# Patient Record
Sex: MALE | Race: WHITE
[De-identification: names, ages, dates, MRNs, and addresses within clinical notes are randomized per-mention and may not be internally consistent; named-entity substitution may affect disease eponyms.]

---

## 2018-12-12 ENCOUNTER — HOSPITAL ENCOUNTER (EMERGENCY)
Dept: HOSPITAL 33 - ED | Age: 53
Discharge: HOME | End: 2018-12-12
Payer: SELF-PAY

## 2018-12-12 VITALS — OXYGEN SATURATION: 99 % | HEART RATE: 68 BPM | SYSTOLIC BLOOD PRESSURE: 165 MMHG | DIASTOLIC BLOOD PRESSURE: 106 MMHG

## 2018-12-12 DIAGNOSIS — E78.00: ICD-10-CM

## 2018-12-12 DIAGNOSIS — Z79.899: ICD-10-CM

## 2018-12-12 DIAGNOSIS — M54.2: ICD-10-CM

## 2018-12-12 DIAGNOSIS — R51: Primary | ICD-10-CM

## 2018-12-12 DIAGNOSIS — M54.5: ICD-10-CM

## 2018-12-12 DIAGNOSIS — I10: ICD-10-CM
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
## 2018-12-12 PROCEDURE — 99285 EMERGENCY DEPT VISIT HI MDM: CPT

## 2018-12-12 PROCEDURE — 72125 CT NECK SPINE W/O DYE: CPT

## 2018-12-12 PROCEDURE — 72131 CT LUMBAR SPINE W/O DYE: CPT

## 2018-12-12 PROCEDURE — 73030 X-RAY EXAM OF SHOULDER: CPT

## 2018-12-12 PROCEDURE — 96372 THER/PROPH/DIAG INJ SC/IM: CPT

## 2018-12-12 PROCEDURE — 70450 CT HEAD/BRAIN W/O DYE: CPT
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
## 2020-08-16 ENCOUNTER — HOSPITAL ENCOUNTER (EMERGENCY)
Dept: HOSPITAL 33 - ED | Age: 55
Discharge: HOME | End: 2020-08-16
Payer: COMMERCIAL

## 2020-08-16 VITALS — SYSTOLIC BLOOD PRESSURE: 145 MMHG | OXYGEN SATURATION: 100 % | DIASTOLIC BLOOD PRESSURE: 98 MMHG

## 2020-08-16 VITALS — HEART RATE: 76 BPM

## 2020-08-16 DIAGNOSIS — R05: Primary | ICD-10-CM

## 2020-08-16 DIAGNOSIS — J02.8: ICD-10-CM

## 2020-08-16 DIAGNOSIS — U07.1: ICD-10-CM

## 2020-08-16 LAB
ALBUMIN SERPL-MCNC: 4.3 G/DL (ref 3.5–5)
ALP SERPL-CCNC: 98 U/L (ref 38–126)
ALT SERPL-CCNC: 18 U/L (ref 0–50)
ANION GAP SERPL CALC-SCNC: 10 MEQ/L (ref 5–15)
AST SERPL QL: 22 U/L (ref 17–59)
BASOPHILS # BLD AUTO: 0.02 10*3/UL (ref 0–0.4)
BASOPHILS NFR BLD AUTO: 0.3 % (ref 0–0.4)
BILIRUB BLD-MCNC: 0.7 MG/DL (ref 0.2–1.3)
BUN SERPL-MCNC: 18 MG/DL (ref 9–20)
CALCIUM SPEC-MCNC: 9.6 MG/DL (ref 8.4–10.2)
CHLORIDE SERPL-SCNC: 104 MMOL/L (ref 98–107)
CO2 SERPL-SCNC: 29 MMOL/L (ref 22–30)
CREAT SERPL-MCNC: 1.01 MG/DL (ref 0.66–1.25)
EOSINOPHIL # BLD AUTO: 0.61 10*3/UL (ref 0–0.5)
GLUCOSE SERPL-MCNC: 105 MG/DL (ref 74–106)
HCT VFR BLD AUTO: 41.8 % (ref 42–50)
HGB BLD-MCNC: 14.4 GM/DL (ref 12.5–18)
LYMPHOCYTES # SPEC AUTO: 1.61 10*3/UL (ref 1–4.6)
MCH RBC QN AUTO: 31.2 PG (ref 26–32)
MCHC RBC AUTO-ENTMCNC: 34.4 G/DL (ref 32–36)
MONOCYTES # BLD AUTO: 0.61 10*3/UL (ref 0–1.3)
PLATELET # BLD AUTO: 176 K/MM3 (ref 150–450)
POTASSIUM SERPLBLD-SCNC: 4.9 MMOL/L (ref 3.5–5.1)
PROT SERPL-MCNC: 7.7 G/DL (ref 6.3–8.2)
RBC # BLD AUTO: 4.62 M/MM3 (ref 4.1–5.6)
SODIUM SERPL-SCNC: 138 MMOL/L (ref 137–145)
WBC # BLD AUTO: 7.5 K/MM3 (ref 4–10.5)

## 2020-08-16 PROCEDURE — 71045 X-RAY EXAM CHEST 1 VIEW: CPT

## 2020-08-16 PROCEDURE — 99284 EMERGENCY DEPT VISIT MOD MDM: CPT

## 2020-08-16 PROCEDURE — 80053 COMPREHEN METABOLIC PANEL: CPT

## 2020-08-16 PROCEDURE — 36415 COLL VENOUS BLD VENIPUNCTURE: CPT

## 2020-08-16 PROCEDURE — 85025 COMPLETE CBC W/AUTO DIFF WBC: CPT

## 2020-08-16 PROCEDURE — U0003 INFECTIOUS AGENT DETECTION BY NUCLEIC ACID (DNA OR RNA); SEVERE ACUTE RESPIRATORY SYNDROME CORONAVIRUS 2 (SARS-COV-2) (CORONAVIRUS DISEASE [COVID-19]), AMPLIFIED PROBE TECHNIQUE, MAKING USE OF HIGH THROUGHPUT TECHNOLOGIES AS DESCRIBED BY CMS-2020-01-R: HCPCS

## 2020-08-16 NOTE — XRAY
Indication: Cough and sore throat.



Comparison: October 21, 2019.



Portable chest again demonstrates normal heart and lungs with incidental

calcified granulomas.  Bony thorax intact again with mild degenerative

changes.  No new/acute findings.
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
## 2023-04-05 ENCOUNTER — HOSPITAL ENCOUNTER (EMERGENCY)
Dept: HOSPITAL 33 - ED | Age: 58
Discharge: HOME | End: 2023-04-05
Payer: COMMERCIAL

## 2023-04-05 VITALS — HEART RATE: 65 BPM | SYSTOLIC BLOOD PRESSURE: 158 MMHG | OXYGEN SATURATION: 98 % | DIASTOLIC BLOOD PRESSURE: 100 MMHG

## 2023-04-05 DIAGNOSIS — S50.11XA: ICD-10-CM

## 2023-04-05 DIAGNOSIS — Z28.310: ICD-10-CM

## 2023-04-05 DIAGNOSIS — E78.5: ICD-10-CM

## 2023-04-05 DIAGNOSIS — Z79.899: ICD-10-CM
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
## 2023-04-05 DIAGNOSIS — K44.9: ICD-10-CM

## 2023-04-05 DIAGNOSIS — S50.12XA: Primary | ICD-10-CM

## 2023-04-05 DIAGNOSIS — R07.81: ICD-10-CM

## 2023-04-05 DIAGNOSIS — J92.9: ICD-10-CM

## 2023-04-05 DIAGNOSIS — D71: ICD-10-CM

## 2023-04-05 DIAGNOSIS — K76.89: ICD-10-CM

## 2023-04-05 DIAGNOSIS — I10: ICD-10-CM

## 2023-04-05 PROCEDURE — 99283 EMERGENCY DEPT VISIT LOW MDM: CPT

## 2023-04-05 PROCEDURE — 73090 X-RAY EXAM OF FOREARM: CPT

## 2023-04-05 PROCEDURE — 71250 CT THORAX DX C-: CPT

## 2023-04-05 NOTE — ERPHSYRPT
- History of Present Illness


Time Seen by Provider: 04/05/23 18:47


Source: patient


Exam Limitations: no limitations


Patient Subjective Stated Complaint: PT states "I slipped on the steps and slid 

down 3 stairs.  My back on the right side hurts and my right forearm."


Triage Nursing Assessment: Pt presented alert and oriented X 3, skin pwd. Pt 

ambulates with an upright steady gait, able to speak in clear full sentences. Pt

has bruising, swelling, and abraison noted to right forerm, abrasion noted to 

back of right shoulder down to lower ribs on right side, tenderness noted.


Physician History: 


Patient is a 58-year-old male presents to our ED for evaluation status post slip

and fall.  Patient was descending 3 metal steps.  Patient slipped and fell onto 

his back.  Patient complains of pain at both forearms and right ribs.  Injury 

occurred prior to arrival.  No neck pain.  Cervical spine cleared clinically.  

No BHT or LOC.  Fall was mechanical.  Fall was not associated with any neuro 

cardiovascular symptomology.  No associated chest pain or shortness of breath.  

No nausea vomiting or diaphoresis.  No numbness tingling or weakness.  Patient's

pain is mild to moderate in intensity.  Patient declined pain medication.  

Patient's significant other at bedside.  They voiced no other complaints or 

concerns at this time.











Portions of this note were created with voice recognition technology.  There may

be grammatical, spelling, punctuation or sound alike errors





Timing/Duration: today


Severity: moderate


Modifying Factors: Improves With: movement


Associated Symptoms: denies symptoms


Allergies/Adverse Reactions: 








No Known Drug Allergies Allergy (Verified 08/16/20 09:31)


   





Home Medications: 








Amlodipine Besylate 5 mg*** [Norvasc 5 mg***] 1 tab PO DAILY 12/12/18 [History]


Atorvastatin Calcium 40 mg PO DAILY 12/12/18 [History]


Triamterene/Hydrochlorothiazid [Triamterene-Hctz 37.5-25 mg Tb] 1 tab PO DAILY 

12/12/18 [History]





Hx Tetanus, Diphtheria Vaccination/Date Given: No


Hx Influenza Vaccination/Date Given: No


Hx Pneumococcal Vaccination/Date Given: No


Immunizations Up to Date: No





Travel Risk





- International Travel


Have you traveled outside of the country in past 3 weeks: No





- Coronavirus Screening


Are you exhibiting any of the following symptoms?: No


Close contact with a COVID-19 positive Pt in past 14-21 Days: No





- Vaccine Status


Have you recieved a Covid-19 vaccination: No





- Review of Systems


Constitutional: No Symptoms, No Fever, No Chills


Eyes: No Symptoms


Ears, Nose, & Throat: No Symptoms


Respiratory: No Symptoms, No Cough, No Dyspnea


Cardiac: No Symptoms, No Chest Pain, No Edema, No Syncope


Abdominal/Gastrointestinal: No Symptoms, No Abdominal Pain, No Nausea, No 

Vomiting, No Diarrhea


Genitourinary Symptoms: No Symptoms, No Dysuria


Musculoskeletal: No Symptoms, No Back Pain, No Neck Pain


Skin: No Symptoms, No Rash


Neurological: No Symptoms, No Dizziness, No Focal Weakness, No Sensory Changes


Psychological: No Symptoms


Endocrine: No Symptoms


Hematologic/Lymphatic: No Symptoms


Immunological/Allergic: No Symptoms


All Other Systems: Reviewed and Negative





- Past Medical History


Pertinent Past Medical History: Yes


Neurological History: No Pertinent History


ENT History: No Pertinent History


Cardiac History: No Pertinent History, High Cholesterol, Hypertension


Respiratory History: No Pertinent History


Endocrine Medical History: No Pertinent History


Musculoskeletal History: No Pertinent History


GI Medical History: Hernia


 History: No Pertinent History


Psycho-Social History: No Pertinent History


Male Reproductive Disorders: No Pertinent History


Other Medical History: Fx to 3rd and 4th lumbar





- Past Surgical History


Past Surgical History: Yes


Neuro Surgical History: No Pertinent History


Cardiac: No Pertinent History


Respiratory: No Pertinent History


Gastrointestinal: No Pertinent History


Genitourinary: No Pertinent History


Musculoskeletal: Orthopedic Surgery


Male Surgical History: No Pertinent History


Other Surgical History: left elbow, joy knee surgery





- Social History


Smoking Status: Never smoker


Exposure to second hand smoke: No


Drug Use: none


Patient Lives Alone: No





- Nursing Vital Signs


Nursing Vital Signs: 


                               Initial Vital Signs











Temperature  98.6 F   04/05/23 17:28


 


Pulse Rate  66   04/05/23 17:28


 


Respiratory Rate  20   04/05/23 17:28


 


Blood Pressure  172/104   04/05/23 17:28


 


O2 Sat by Pulse Oximetry  99   04/05/23 17:28








                                   Pain Scale











Pain Intensity                 4

















- Physical Exam


General Appearance: no apparent distress, alert


Eye Exam: PERRL/EOMI, eyes nml inspection


Ears, Nose, Throat Exam: normal ENT inspection, TMs normal, pharynx normal, 

moist mucous membranes


Neck Exam: normal inspection, non-tender, supple, full range of motion


Respiratory Exam: normal breath sounds, lungs clear, No respiratory distress


Cardiovascular Exam: regular rate/rhythm, normal heart sounds, normal peripheral

 pulses


Gastrointestinal/Abdomen Exam: soft, normal bowel sounds, No tenderness, No mass


Back Exam: normal inspection, normal range of motion, No CVA tenderness, No 

vertebral tenderness


Extremity Exam: normal inspection, normal range of motion, pelvis stable


Neurologic Exam: alert, oriented x 3, cooperative, normal mood/affect, nml 

cerebellar function, nml station & gait, sensation nml, No motor deficits


Skin Exam: normal color, warm, dry, No rash


Lymphatic Exam: No adenopathy


**SpO2 Interpretation**: normal


SpO2: 99


O2 Delivery: Room Air





- Course


Nursing assessment & vital signs reviewed: Yes





- Radiology Exams


  ** Forearm


X-ray Interpretation: Interpreted by me (No fracture dislocations.)





  ** Other


X-ray Interpretation: Interpreted by me (Left forearm, no fracture dislocations.

  Soft tissue swelling)





- CT Exams


  ** Chest


CT Interpretation: Tele-radiologist Report (No comps.  Minimal right base 

calcified pleural thickening.  5 mm hepatic cyst and old granulomatous disease 

otherwise normal chest.  Also small hiatal hernia)


Ordered Tests: 


                               Active Orders 24 hr











 Category Date Time Status


 


 CHEST WITHOUT CONTRAST [CT] Stat Exams  04/05/23 17:49 Taken


 


 FOREARM Stat Exams  04/05/23 17:50 Taken


 


 FOREARM Stat Exams  04/05/23 17:50 Taken














- Progress


Progress: improved


Progress Note: 


4-year-old male with mechanical fall.  Physical exam revealed soft tissue 

swelling bilateral forearms.  X-rays of both forearms negative for fracture.  No

 dislocations.  CT chest negative for rib fracture.  Patient declined pain 

medication.  Patient reassessed.  Vital stable.  Will discharge home.  Patient 

agrees to follow-up with his primary care doctor within 48 hours for 

reevaluation.











Portions of this note were created with voice recognition technology.  There may

 be grammatical, spelling, punctuation or sound alike errors











Complexity of problem addressed is low.  Acute uncomplicated.  No critical care 

time.








Patient received 2 x-rays and a CT scan of chest.  Patient served as independent

 historian.  Patient's significant other at bedside.  Patient's significant 

other contributed to HPI.  Complexity of data reviewed and analyzed is moderate.

  X-rays independently reviewed by Dr. Sotomayor.











Risk of complication and or risk morbidity/mortality of patient management is 

minimal.  Patient declined pain medication.  Patient will be discharged home.





Plan of care discussed with patient.  Plan of care established via shared 

decision making model.  Vital stable.  No social determinants of health complica

ting patient's follow-up.





Patient/significant other voiced no other complaints or concerns at this time.





Portions of this note were created with voice recognition technology.  There may

 be grammatical, spelling, punctuation or sound alike errors











04/05/23 19:26





Counseled pt/family regarding: diagnosis, need for follow-up, rad results





- Departure


Departure Disposition: Home


Clinical Impression: 


 Fall, Forearm contusion, Right lung base calcified pleural plaqui, 5 mm hepatic

 cyst, Old granulomatous disease, Small hiatal hernia





Condition: Stable


Critical Care Time: No


Referrals: 


DOCTOR,NO FAMILY [Primary Care Provider] - Follow up/PCP as directed


MICHELET CARRIZALES MD [ACTIVE STAFF] - Follow up/PCP as directed


Additional Instructions: 


Discharge/Care Plan





CHARLES BLUE was seen on 04/05/23 in the Emergency Room. The patient was 

counseled regarding Diagnosis,Lab results, Imaging studies, need for follow up 

and when to return to the Emergency Room.





Prescriptions given:





Discharge Note





I have spoken with the patient and/or caregivers. I have explained the patient's

condition, diagnosis and treatment plan based on the information available to me

at this time. I have answered the patient's and/or caregiver's questions and 

addressed any concerns. The patient and/or caregivers have as good understanding

of the patient's diagnosis, condition and treatment plan as can be expected at 

this point. The vital signs have been stable. The patient's condition is stable 

and appropriate for discharge from the emergency department.





The patient will pursue further outpatient evaluation with the primary care 

physician or other designated or consulting physician as outlined in the 

discharge instructions. The patient and/or caregivers are agreeable to this plan

of care and follow-up instructions have been explained in detail. The patient 

and/or caregivers have received these instruction. The patient/and or caregivers

are aware that any significant change in condition or worsening of symptoms 

should prompt an immediate return to this or the closest emergency department or

call 911.

## 2023-04-06 NOTE — XRAY
Indication: Soft tissue swelling following fall.



Comparison: None



2 view left forearm demonstrates mild distal lateral soft tissue swelling.  No

other bony, articular, or soft tissue abnormalities.

## 2023-04-06 NOTE — XRAY
Indication: Soft tissue swelling following fall.



Comparison: None



2 view right forearm demonstrates mild proximal anterior medial soft tissue

swelling.  No other bony, articular, or soft tissue abnormalities.

## 2023-04-06 NOTE — XRAY
Indication: Right rib pain following fall.



Multiple contiguous axial images obtained through the chest without contrast.



Comparison: None



Lungs demonstrates minimal bilateral dependent atelectasis, very minimal right

posterior gutter calcified pleural plaquing and left lower lobe bullae.  No

suspicious pulmonary mass/nodule, infiltrate, effusion, or pneumothorax.

Heart not enlarged.  Aorta is normal in course and caliber.  Small mediastinal

and left hilar calcified nodes.  No pathologic mediastinal lymphadenopathy.

Small hiatal hernia.



Bony thorax intact with mild degenerative changes throughout the spine.



Limited upper abdomen demonstrates 5 mm hepatic cyst and previous

cholecystectomy.



Impression:

1.  Chronic findings including left lower lobe bullae, right base calcified

pleural plaquing, hiatal hernia, degenerative spondylosis, hepatic cyst, and

old granulomatous disease.

2.  Remaining CT chest without contrast exam is negative.

## 2025-03-25 ENCOUNTER — HOSPITAL ENCOUNTER (EMERGENCY)
Dept: HOSPITAL 33 - ED | Age: 60
Discharge: HOME | End: 2025-03-25
Payer: COMMERCIAL

## 2025-03-25 VITALS — RESPIRATION RATE: 16 BRPM | SYSTOLIC BLOOD PRESSURE: 146 MMHG | DIASTOLIC BLOOD PRESSURE: 92 MMHG

## 2025-03-25 VITALS — TEMPERATURE: 98.9 F

## 2025-03-25 VITALS — HEART RATE: 79 BPM | OXYGEN SATURATION: 99 %

## 2025-03-25 DIAGNOSIS — N43.3: Primary | ICD-10-CM

## 2025-03-25 DIAGNOSIS — R10.32: ICD-10-CM

## 2025-03-25 DIAGNOSIS — Z79.899: ICD-10-CM

## 2025-03-25 DIAGNOSIS — N50.812: ICD-10-CM

## 2025-03-25 DIAGNOSIS — R31.9: ICD-10-CM

## 2025-03-25 DIAGNOSIS — Z79.891: ICD-10-CM

## 2025-03-25 DIAGNOSIS — E78.5: ICD-10-CM

## 2025-03-25 DIAGNOSIS — I10: ICD-10-CM

## 2025-03-25 DIAGNOSIS — K57.92: ICD-10-CM

## 2025-03-25 LAB
ALBUMIN SERPL-MCNC: 4.7 G/DL (ref 3.5–5)
ALP SERPL-CCNC: 81 U/L (ref 38–126)
ALT SERPL-CCNC: 22 U/L (ref 0–50)
ANION GAP SERPL CALC-SCNC: 16.1 MEQ/L (ref 5–15)
AST SERPL QL: 31 U/L (ref 17–59)
BASOPHILS # BLD AUTO: 0.03 X10^3/UL (ref 0.01–0.08)
BASOPHILS NFR BLD AUTO: 0.3 % (ref 0.2–1.2)
BILIRUB BLD-MCNC: 1.2 MG/DL (ref 0.2–1.3)
BUN SERPL-MCNC: 19 MG/DL (ref 9–20)
CALCIUM SPEC-MCNC: 9.4 MG/DL (ref 8.4–10.2)
CHLORIDE SERPL-SCNC: 103 MMOL/L (ref 98–107)
CO2 SERPL-SCNC: 24 MMOL/L (ref 22–30)
CREAT SERPL-MCNC: 0.92 MG/DL (ref 0.66–1.25)
EOSINOPHIL # BLD AUTO: 0.1 X10^3/UL (ref 0.04–0.54)
GFR SERPLBLD BASED ON 1.73 SQ M-ARVRAT: 94.1 ML/MIN
GLUCOSE SERPL-MCNC: 91 MG/DL (ref 74–106)
HCT VFR BLD AUTO: 40.3 % (ref 40.1–51)
HGB BLD-MCNC: 14.4 G/DL (ref 13.7–17.5)
IMM GRANULOCYTES # BLD: 0.05 X10^3U/L (ref 0–0.03)
IMM GRANULOCYTES NFR BLD: 0.4 % (ref 0–0.43)
LYMPHOCYTES # SPEC AUTO: 1.6 X10^3/UL (ref 1.32–3.57)
MCH RBC QN AUTO: 30.9 PG (ref 25.7–32.2)
MCHC RBC AUTO-ENTMCNC: 35.7 G/DL (ref 32.3–36.5)
MONOCYTES # BLD AUTO: 0.76 X10^3/UL (ref 0.3–0.82)
NRBC # BLD AUTO: 0 X10^3U/L (ref 0–0.01)
NRBC BLD AUTO-RTO: 0 % (ref 0–0.2)
PLATELET # BLD AUTO: 209 X10^3/UL (ref 163–337)
POTASSIUM SERPLBLD-SCNC: 4.6 MMOL/L (ref 3.5–5.1)
PROT SERPL-MCNC: 7.7 G/DL (ref 6.3–8.2)
RBC # BLD AUTO: 4.66 X10^6/UL (ref 4.63–6.08)
RBC # URNS HPF: (no result) /HPF (ref 0–5)
SODIUM SERPL-SCNC: 138 MMOL/L (ref 135–145)
WBC # BLD AUTO: 11.5 X10^3/UL (ref 4.23–9.07)
WBC URNS QL MICRO: (no result) /HPF (ref 0–5)

## 2025-03-25 PROCEDURE — 96374 THER/PROPH/DIAG INJ IV PUSH: CPT

## 2025-03-25 PROCEDURE — 74176 CT ABD & PELVIS W/O CONTRAST: CPT

## 2025-03-25 PROCEDURE — 80053 COMPREHEN METABOLIC PANEL: CPT

## 2025-03-25 PROCEDURE — 85025 COMPLETE CBC W/AUTO DIFF WBC: CPT

## 2025-03-25 PROCEDURE — 96361 HYDRATE IV INFUSION ADD-ON: CPT

## 2025-03-25 PROCEDURE — 99285 EMERGENCY DEPT VISIT HI MDM: CPT

## 2025-03-25 PROCEDURE — 96375 TX/PRO/DX INJ NEW DRUG ADDON: CPT

## 2025-03-25 PROCEDURE — 96368 THER/DIAG CONCURRENT INF: CPT

## 2025-03-25 PROCEDURE — 84484 ASSAY OF TROPONIN QUANT: CPT

## 2025-03-25 PROCEDURE — 36415 COLL VENOUS BLD VENIPUNCTURE: CPT

## 2025-03-25 PROCEDURE — 76870 US EXAM SCROTUM: CPT

## 2025-03-25 PROCEDURE — 96365 THER/PROPH/DIAG IV INF INIT: CPT

## 2025-03-25 PROCEDURE — 81001 URINALYSIS AUTO W/SCOPE: CPT

## 2025-03-25 RX ADMIN — LEVOFLOXACIN STA MLS/HR: 5 INJECTION, SOLUTION INTRAVENOUS at 19:56

## 2025-03-25 RX ADMIN — KETOROLAC TROMETHAMINE ONE MG: 30 INJECTION, SOLUTION INTRAMUSCULAR; INTRAVENOUS at 17:38

## 2025-03-25 RX ADMIN — METRONIDAZOLE STA MLS/HR: 500 INJECTION, SOLUTION INTRAVENOUS at 19:12

## 2025-03-25 NOTE — ERPHSYRPT
- History of Present Illness


Time Seen by Provider: 03/25/25 17:04


Source: patient


Exam Limitations: no limitations


Patient Subjective Stated Complaint: pt states that he began to have pain to his

left groin pain. pt states that the pain is where he had a hernia repair


Triage Nursing Assessment: pt ambulated into the er; pt is axo x4; c/o left gr

oin pain; pt states 8/10 pain to left groin that radiates to left testicle; no 

swelling present to left testicle; skin PDW; no respiratory distress present; 

hypertensive


Physician History: 


60-year-old male presents to our ED for evaluation of acute onset left lower 

quadrant pain.  Patient reports the pain radiates into his left testicle.  Pain 

started just prior to arrival.  No history of kidney stones no trauma no fever 

no nausea no vomiting no blood in stool.  Symptoms are moderate in intensity.  

Patient rates his pain 8 out of 10 at its worst.  Patient requesting pain 

medication at this time.  Significant other at bedside.  They voiced no other 

complaints or concerns at this time.











Timing/Duration: today


Severity: moderate


Modifying Factors: Improves With: nothing


Associated Symptoms: denies symptoms


Allergies/Adverse Reactions: 








No Known Drug Allergies Allergy (Verified 03/25/25 16:53)


   





Home Medications: 








Ezetimibe 10 mg** [Zetia 10 MG**] 10 mg PO DAILY 03/25/25 [History]





Hx Tetanus, Diphtheria Vaccination/Date Given: No


Hx Influenza Vaccination/Date Given: No


Hx Pneumococcal Vaccination/Date Given: No





Travel Risk





- International Travel


Have you traveled outside of the country in past 3 weeks: No





- Emerging Infectious Disease


Are you exhibiting symptoms associated with any current EIDs: No





- Review of Systems


Constitutional: No Symptoms, No Fever, No Chills


Eyes: No Symptoms


Ears, Nose, & Throat: No Symptoms


Respiratory: No Symptoms, No Cough, No Dyspnea


Cardiac: No Symptoms, No Chest Pain, No Edema, No Syncope


Abdominal/Gastrointestinal: No Symptoms, No Abdominal Pain, No Nausea, No 

Vomiting, No Diarrhea


Genitourinary Symptoms: No Symptoms, No Dysuria


Musculoskeletal: No Symptoms, No Back Pain, No Neck Pain


Skin: No Symptoms, No Rash


Neurological: No Symptoms, No Dizziness, No Focal Weakness, No Sensory Changes


Psychological: No Symptoms


Endocrine: No Symptoms


Hematologic/Lymphatic: No Symptoms


Immunological/Allergic: No Symptoms


All Other Systems: Reviewed and Negative





- Past Medical History


Pertinent Past Medical History: Yes


Neurological History: No Pertinent History


ENT History: No Pertinent History


Cardiac History: No Pertinent History, High Cholesterol, Hypertension


Respiratory History: No Pertinent History


Endocrine Medical History: No Pertinent History


Musculoskeletal History: No Pertinent History


GI Medical History: Hernia


 History: No Pertinent History


Psycho-Social History: No Pertinent History


Male Reproductive Disorders: No Pertinent History


Other Medical History: Fx to 3rd and 4th lumbar





- Past Surgical History


Past Surgical History: Yes


Neuro Surgical History: No Pertinent History


Cardiac: No Pertinent History


Respiratory: No Pertinent History


Gastrointestinal: No Pertinent History


Genitourinary: No Pertinent History


Musculoskeletal: Orthopedic Surgery


Male Surgical History: No Pertinent History


Other Surgical History: left elbow, joy knee surgery





- Social History


Smoking Status: Never smoker


Exposure to second hand smoke: No


Drug Use: none





- Social Determinants of Health


Will the patient participate in the screening: Yes


Do you worry about a steady place to live?: No


Do you have any problems with any of the following?: No known problems


In the past 12 months,have you had to go without utilities?: No


Transportation Issues: No


Has anyone in your support network made you feel unsafe?: No


Have you or anyone in your house had to go w/o enough food: No





- Nursing Vital Signs


Nursing Vital Signs: 


                               Initial Vital Signs











Temperature  98.9 F   03/25/25 16:54


 


Pulse Rate  82   03/25/25 16:54


 


Respiratory Rate  18   03/25/25 16:54


 


Blood Pressure  176/109   03/25/25 16:54


 


O2 Sat by Pulse Oximetry  99   03/25/25 16:54








                                   Pain Scale











Pain Intensity                 5

















- Physical Exam


General Appearance: no apparent distress, alert


Eye Exam: PERRL/EOMI, eyes nml inspection


Ears, Nose, Throat Exam: normal ENT inspection, TMs normal, moist mucous membran

es


Neck Exam: normal inspection, full range of motion


Respiratory Exam: normal breath sounds, lungs clear, No respiratory distress


Cardiovascular Exam: regular rate/rhythm, normal heart sounds, normal peripheral

 pulses


Gastrointestinal/Abdomen Exam: soft, normal bowel sounds, other (Tenderness to 

palpation left lower quadrant.), No tenderness, No mass


Back Exam: normal inspection, normal range of motion, No CVA tenderness, No 

vertebral tenderness


Extremity Exam: normal inspection, normal range of motion, pelvis stable


Neurologic Exam: alert, oriented x 3, cooperative, normal mood/affect, nml 

cerebellar function, nml station & gait, sensation nml, No motor deficits


Skin Exam: normal color, warm, dry, No rash


Lymphatic Exam: No adenopathy


**SpO2 Interpretation**: normal


SpO2: 99


O2 Delivery: Room Air





- Course


Nursing assessment & vital signs reviewed: Yes





- Radiology Ultrasound Exam


  ** Scrotal


Ultrasound: tele radiology report (Bilateral hydrocele.  No obvious hernia seen 

with Valsalva.)


Ordered Tests: 


                               Active Orders 24 hr











 Category Date Time Status


 


 IV Insertion STAT Care  03/25/25 17:02 Active


 


 ABDOMEN AND PELVIS W/0 CONTRAS [CT] Stat Exams  03/25/25 17:03 Taken


 


 TESTICLE [US] Stat Exams  03/25/25 17:04 Taken


 


 CBC W DIFF Stat Lab  03/25/25 17:30 Completed


 


 CMP Stat Lab  03/25/25 17:30 Completed


 


 TROPONIN Q4H Lab  03/25/25 17:30 Completed


 


 TROPONIN Q4H Lab  03/25/25 21:15 Ordered


 


 TROPONIN Q4H Lab  03/26/25 01:15 Ordered


 


 UA W/RFX UR CULTURE Stat Lab  03/25/25 17:30 Completed








Medication Summary











Generic Name Dose Route Start Last Admin





  Trade Name Freq  PRN Reason Stop Dose Admin


 


Levofloxacin/Dextrose  500 mg in 100 mls @ 100 mls/hr  03/25/25 19:07 





  Levofloxacin 500mg/100ml D5w  IV  03/25/25 20:06 





  STAT STA  


 


Metronidazole  500 mg in 100 mls @ 200 mls/hr  03/25/25 19:07 





  Flagyl 500 Mg Ivpb  IV  03/25/25 19:36 





  STAT STA  














Discontinued Medications














Generic Name Dose Route Start Last Admin





  Trade Name Freq  PRN Reason Stop Dose Admin


 


Sodium Chloride  1,000 mls @ 999 mls/hr  03/25/25 17:02  03/25/25 18:44





  Sodium Chloride 0.9% 1000 Ml  IV  03/25/25 18:02  Infused





  .Q1H1M STA   Infusion


 


Sodium Chloride  Confirm  03/25/25 17:35 





  Sodium Chloride 0.9% 1000 Ml  Administered  03/25/25 17:36 





  Dose  





  1,000 mls @ ud  





  .ROUTE  





  .STK-MED ONE  


 


Ketorolac Tromethamine  30 mg  03/25/25 17:02  03/25/25 17:38





  Ketorolac Tromethamine 30 Mg/Ml Inj  IV  03/25/25 17:03  30 mg





  STAT ONE   Administration


 


Ketorolac Tromethamine  Confirm  03/25/25 17:35 





  Ketorolac Tromethamine 30 Mg/Ml Inj  Administered  03/25/25 17:36 





  Dose  





  30 mg  





  .ROUTE  





  .STK-MED ONE  











Lab/Rad Data: 


                           Laboratory Result Diagrams





                                 03/25/25 17:30 





                                 03/25/25 17:30 





                               Laboratory Results











  03/25/25 03/25/25 03/25/25 Range/Units





  17:30 17:30 17:30 


 


WBC    11.5 H  (4.23-9.07)  x10^3/uL


 


RBC    4.66  (4.63-6.08)  x10^6/uL


 


Hgb    14.4  (13.7-17.5)  g/dL


 


Hct    40.3  (40.1-51.0)  %


 


MCV    86.5  (79.0-92.2)  fL


 


MCH    30.9  (25.7-32.2)  pg


 


MCHC    35.7  (32.3-36.5)  g/dL


 


RDW    11.9  (11.6-14.4)  %


 


Plt Count    209  (163-337)  x10^3/uL


 


MPV    9.3 L  (9.4-12.4)  fL


 


Gran %    77.9 H  (34.0-67.9)  %


 


Immature Gran % (Auto)    0.4  (0.001-0.429)  %


 


Nucleat RBC Rel Count    0.0  (0.00-0.2)  %


 


Eos # (Auto)    0.10  (0.04-0.54)  x10^3/uL


 


Immature Gran # (Auto)    0.05 H  (0.001-0.031)  x10^3u/L


 


Absolute Lymphs (auto)    1.60  (1.32-3.57)  x10^3/uL


 


Absolute Monos (auto)    0.76  (0.30-0.82)  x10^3/uL


 


Absolute Nucleated RBC    0.00  (0.00-0.012)  x10^3u/L


 


Lymphocytes %    13.9 L  (21.8-53.1)  %


 


Monocytes %    6.6  (5.3-12.2)  %


 


Eosinophils %    0.9  (0.8-7.0)  %


 


Basophils %    0.3  (0.2-1.2)  %


 


Absolute Granulocytes    8.95 H  (1.78-5.38)  x10^3/uL


 


Basophils #    0.03  (0.01-0.08)  x10^3/uL


 


Sodium   138   (135-145)  mmol/L


 


Potassium   4.6   (3.5-5.1)  mmol/L


 


Chloride   103   ()  mmol/L


 


Carbon Dioxide   24   (22-30)  mmol/L


 


Anion Gap   16.1 H   (5-15)  MEQ/L


 


BUN   19   (9-20)  mg/dL


 


Creatinine   0.92   (0.66-1.25)  mg/dL


 


Estimated GFR   94.1   ML/MIN


 


Glucose   91   ()  mg/dL


 


Calcium   9.4   (8.4-10.2)  mg/dL


 


Total Bilirubin   1.20   (0.2-1.3)  mg/dL


 


AST   31   (17-59)  U/L


 


ALT   22   (0-50)  U/L


 


Alkaline Phosphatase   81   ()  U/L


 


Troponin I  < 0.012    (0.000-0.033)  ng/mL


 


Serum Total Protein   7.7   (6.3-8.2)  g/dL


 


Albumin   4.7   (3.5-5.0)  g/dL


 


Urine Color     (Yellow)  


 


Urine Appearance     (Clear)  


 


Urine pH     (4.6-8.0)  


 


Ur Specific Gravity     (1.005-1.030)  


 


Urine Protein     (Negative)  


 


Urine Glucose (UA)     (Negative)  mg/dL


 


Urine Ketones     (Negative)  


 


Urine Blood     (Negative)  


 


Urine Nitrite     (Negative)  


 


Urine Bilirubin     (Negative)  


 


Urine Urobilinogen     (0.2)  mg/dL


 


Ur Leukocyte Esterase     (Negative)  


 


U Hyaline Cast (Auto)     (0-2)  /LPF


 


Urine Microscopic RBC     (0-5)  /HPF


 


Urine Microscopic WBC     (0-5)  /HPF


 


Ur Epithelial Cells     (None Seen)  /HPF


 


Urine Bacteria     (None Seen)  /HPF


 


Urine Culture Reflexed     (NO)  














  03/25/25 Range/Units





  17:30 


 


WBC   (4.23-9.07)  x10^3/uL


 


RBC   (4.63-6.08)  x10^6/uL


 


Hgb   (13.7-17.5)  g/dL


 


Hct   (40.1-51.0)  %


 


MCV   (79.0-92.2)  fL


 


MCH   (25.7-32.2)  pg


 


MCHC   (32.3-36.5)  g/dL


 


RDW   (11.6-14.4)  %


 


Plt Count   (163-337)  x10^3/uL


 


MPV   (9.4-12.4)  fL


 


Gran %   (34.0-67.9)  %


 


Immature Gran % (Auto)   (0.001-0.429)  %


 


Nucleat RBC Rel Count   (0.00-0.2)  %


 


Eos # (Auto)   (0.04-0.54)  x10^3/uL


 


Immature Gran # (Auto)   (0.001-0.031)  x10^3u/L


 


Absolute Lymphs (auto)   (1.32-3.57)  x10^3/uL


 


Absolute Monos (auto)   (0.30-0.82)  x10^3/uL


 


Absolute Nucleated RBC   (0.00-0.012)  x10^3u/L


 


Lymphocytes %   (21.8-53.1)  %


 


Monocytes %   (5.3-12.2)  %


 


Eosinophils %   (0.8-7.0)  %


 


Basophils %   (0.2-1.2)  %


 


Absolute Granulocytes   (1.78-5.38)  x10^3/uL


 


Basophils #   (0.01-0.08)  x10^3/uL


 


Sodium   (135-145)  mmol/L


 


Potassium   (3.5-5.1)  mmol/L


 


Chloride   ()  mmol/L


 


Carbon Dioxide   (22-30)  mmol/L


 


Anion Gap   (5-15)  MEQ/L


 


BUN   (9-20)  mg/dL


 


Creatinine   (0.66-1.25)  mg/dL


 


Estimated GFR   ML/MIN


 


Glucose   ()  mg/dL


 


Calcium   (8.4-10.2)  mg/dL


 


Total Bilirubin   (0.2-1.3)  mg/dL


 


AST   (17-59)  U/L


 


ALT   (0-50)  U/L


 


Alkaline Phosphatase   ()  U/L


 


Troponin I   (0.000-0.033)  ng/mL


 


Serum Total Protein   (6.3-8.2)  g/dL


 


Albumin   (3.5-5.0)  g/dL


 


Urine Color  Yellow  (Yellow)  


 


Urine Appearance  Clear  (Clear)  


 


Urine pH  5.5  (4.6-8.0)  


 


Ur Specific Gravity  1.025  (1.005-1.030)  


 


Urine Protein  Negative  (Negative)  


 


Urine Glucose (UA)  Negative  (Negative)  mg/dL


 


Urine Ketones  Trace A  (Negative)  


 


Urine Blood  Moderate A  (Negative)  


 


Urine Nitrite  Negative  (Negative)  


 


Urine Bilirubin  Negative  (Negative)  


 


Urine Urobilinogen  1.0 A  (0.2)  mg/dL


 


Ur Leukocyte Esterase  Negative  (Negative)  


 


U Hyaline Cast (Auto)  NONE SEEN  (0-2)  /LPF


 


Urine Microscopic RBC  6-10 A  (0-5)  /HPF


 


Urine Microscopic WBC  0-2  (0-5)  /HPF


 


Ur Epithelial Cells  None Seen  (None Seen)  /HPF


 


Urine Bacteria  None Seen  (None Seen)  /HPF


 


Urine Culture Reflexed  NO  (NO)  














- Progress


Progress: improved


Progress Note: 


60-year-old male presents to our ED for evaluation of left lower quadrant pain. 

 Physical exam reveals tenderness to the left lower quadrant.  Patient also 

complained of pain to his left testicle.  Ultrasound left testicle reveals hy

drocele bilaterally.  CT abdomen pelvis reveals a mild diverticulitis.  Patient 

received a dose of Levaquin and Flagyl in our ED.  Patient declined 

hospitalization.  Patient preferred outpatient management.  A prescription for 

Cipro and Flagyl forwarded to patient's pharmacy.  Patient agrees to follow-up 

with his primary care doctor within 48 hours for reevaluation.











Portions of this note were created with voice recognition technology.  There may

 be grammatical, spelling, punctuation or sound alike errors


03/25/25 19:13


Complexity of problem addressed is moderate acute complicated no critical care 

time.  Complex of data reviewed and analyzed is moderate.  Test ordered test 

reviewed results analyzed and correlated clinically with history and physical 

exam.  Risk of complication and or risk of morbidity/mortality of patient 

management is moderate.  A prescription for Cipro Flagyl Norco and Toradol 

forwarded to patient's pharmacy.  Vital stable.  Time spent to discharge patient

 is approximately 15 minutes.  Plan of care established for shared decision 

making.  No social determinants of health present to impede follow-up.














Portions of this note were created with voice recognition technology.  There may

 be grammatical, spelling, punctuation or sound alike errors


03/25/25 19:18





Counseled pt/family regarding: lab results, diagnosis, need for follow-up, rad 

results





- Departure


Departure Disposition: Home


Clinical Impression: 


 Hydrocele, Diverticulitis, Hematuria





Condition: Stable


Critical Care Time: No


Referrals: 


ADAMARIS HAMPTON MD [Primary Care Provider] - Follow up/PCP as directed


Additional Instructions: 


Discharge/Care Plan





CHARLES BLUE was seen on 03/25/25 in the Emergency Room. The patient was 

counseled regarding Diagnosis,Lab results, Imaging studies, need for follow up 

and when to return to the Emergency Room.





Prescriptions given:





Discharge Note





I have spoken with the patient and/or caregivers. I have explained the patient's

condition, diagnosis and treatment plan based on the information available to me

at this time. I have answered the patient's and/or caregiver's questions and 

addressed any concerns. The patient and/or caregivers have as good understanding

of the patient's diagnosis, condition and treatment plan as can be expected at 

this point. The vital signs have been stable. The patient's condition is stable 

and appropriate for discharge from the emergency department.





The patient will pursue further outpatient evaluation with the primary care 

physician or other designated or consulting physician as outlined in the discha

rge instructions. The patient and/or caregivers are agreeable to this plan of 

care and follow-up instructions have been explained in detail. The patient 

and/or caregivers have received these instruction. The patient/and or caregivers

are aware that any significant change in condition or worsening of symptoms 

should prompt an immediate return to this or the closest emergency department or

call 911. 








Prescriptions: 


Hydrocodone/APAP 5/325 [Norco 5/325 mg***] 1 each PO Q6H PRN PRN #10 tablet MDD 

4


 PRN Reason: Pain


Ciprofloxacin [Cipro 500 MG***] 500 mg PO BID 7 Days #14 tablet


Metronidazole 500 mg*** [Flagyl 500 MG***] 500 mg PO TID 7 Days #21 tablet


Ketorolac Trometh 10 mg Tab** [TORAdol 10 MG TABLET***] 10 mg PO TID 5 Days #15 

tablet

## 2025-03-26 NOTE — XRAY
Indication: Pain.



Two-dimensional testicular sonogram performed.



Comparison: None



Both testicles homogeneous in echogenicity with normal color flow.  Right

testicle measures 4.1 x 2.7 x 4.5 cm and left measures 4.3 x 2.6 x 3.3 cm.

Left and right epididymis bilaterally symmetric.  Small nonspecific bilateral

hydroceles.  No suspicious extratesticular mass.



Impression: Small nonspecific bilateral hydroceles.  Remaining testicular

sonogram is negative.



Comment: Preliminary report was given.

## 2025-03-26 NOTE — XRAY
Indication: Left lower quadrant pain.



Multiple contiguous axial images obtained through the abdomen and pelvis

without contrast.



Comparison: February 6, 2015.



Lung bases demonstrate minimal dependent atelectasis.  No infiltrate or

effusion.  Heart not enlarged.  New small hiatal hernia.



Noncontrasted stomach and bowel loops appear nonobstructed with normal

appendix.  There is now mild scattered descending and sigmoid diverticulosis.

Junction descending and sigmoid colon demonstrates new bowel wall thickening

and pericolonic stranding favoring diverticulitis.  Tiny free fluid but no

walled off fluid collection or free air.  Interval cholecystectomy.  Stable

small left renal exophytic cyst.



Remaining liver, pancreas, spleen, adrenal glands, kidneys, ureters, bladder,

and aorta are unremarkable for noncontrast exam.



Osseous structures intact again with minimal/mild degenerative changes

throughout spine again greatest at lumbosacral junction.



Impression:

1.  New scattered colonic diverticulosis with new focus of acute

diverticulitis junction descending and sigmoid.  Tiny reactive free fluid.

2.  New small hiatal hernia.

3.  Again incidental small left renal cyst and degenerative spondylosis.